# Patient Record
(demographics unavailable — no encounter records)

---

## 2025-04-01 NOTE — ASSESSMENT
[FreeTextEntry1] : 1. Chronic Headaches: - MRI brain to rule out structural causes -A trial of sumatriptan for abortive therapy.  Patient warned of the potential side effect of medication and he reported understanding  2.  Cervical radiculopathy - EMG of upper extremities ordered - MRI cervical spine pending insurance approval (may require physical therapy first)  3. Presyncope: -MRA of the brain - Carotid ultrasound  4. Suspected Sleep Apnea: - Home sleep study [place] through [facility] - Follow-up appointment to be scheduled 2 weeks after sleep study completion  5. Post-COVID Taste Loss: - Likely permanent changes given duration
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
## 2025-04-10 NOTE — ASSESSMENT
[FreeTextEntry1] : 39-year-old male with past medical history of hyperlipidemia, sleep apnea, cervicular radiculopathy, and migraines who presents today for cardiovascular evaluation.

## 2025-04-10 NOTE — HISTORY OF PRESENT ILLNESS
[FreeTextEntry1] : 39-year-old male with past medical history of hyperlipidemia, sleep apnea, cervicular radiculopathy, and migraines who presents today for cardiovascular evaluation.  The patient reports a several month history of intermittent palpitations which is described as heart racing fast and thumping.  He states that he checked his Apple Watch and his heart rates were in the 140s.  He reports chronic dizziness during this time as well and near syncopal episodes.  He reports intermittent chest tightness and dyspnea on exertion over the last 6 months.  He is currently chest pain-free.

## 2025-04-10 NOTE — REVIEW OF SYSTEMS
[SOB] : shortness of breath [Dyspnea on exertion] : dyspnea during exertion [Chest Discomfort] : chest discomfort [Lower Ext Edema] : no extremity edema [Leg Claudication] : no intermittent leg claudication [Palpitations] : palpitations [Orthopnea] : no orthopnea [PND] : no PND [Syncope] : no syncope [Negative] : Heme/Lymph

## 2025-04-10 NOTE — DISCUSSION/SUMMARY
[FreeTextEntry1] : 1.  Dyspnea on exertion I will obtain an echocardiogram to evaluate LV function and rule out valvular heart disease.  2.  Palpitations and near syncope. I will obtain an MCOT to rule out arrhythmias as a cause of his symptoms.  3.  Chest pain Recommend a stress echocardiogram to rule out CAD as a cause of symptoms  4.  Hyperlipidemia I have asked the patient to bring his blood work at his next visit so we can assess his LDL and to see if a statin is indicated this time.  I will follow up with the patient after his above testing or sooner if he develops and new or worsening symptoms.  [EKG obtained to assist in diagnosis and management of assessed problem(s)] : EKG obtained to assist in diagnosis and management of assessed problem(s)
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
## 2025-05-08 NOTE — PROCEDURE
[Recalcitrant Symptoms] : recalcitrant symptoms  [Anatomical Abnormality] : anatomical abnormality [Anterior rhinoscopy insufficient to account for symptoms] : anterior rhinoscopy insufficient to account for symptoms [Congested] : congested [Maribeth] : maribeth [Normal] : the paranasal sinuses had no abnormalities [FreeTextEntry2] : Deviated septum

## 2025-05-08 NOTE — PHYSICAL EXAM
[Nasal Endoscopy Performed] : nasal endoscopy was performed, see procedure section for findings [Normal] : mucosa is normal [Midline] : trachea located in midline position [de-identified] : right impacted wax cleaned with curette

## 2025-05-08 NOTE — REASON FOR VISIT
[Initial Evaluation] : an initial evaluation for [FreeTextEntry2] : tinnitus, loss of taste, nasal congestion, GALO

## 2025-05-08 NOTE — ASSESSMENT
[FreeTextEntry1] : Wax found and cleaned. Cleaning well tolerated by patient. Patient felt improvement in cloginess after cleaning.  I reviewed, interpreted, and discussed the Audiogram done today. bilateral SNHL.  Modified Toma maneuver: positive +++  Patient will possible need a nasal valve and turbinates intervention. Discussed details and indications.
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
## 2025-05-15 NOTE — DISCUSSION/SUMMARY
[FreeTextEntry1] : 1.  Dyspnea on Exertion - Echocardiogram performed today revealed normal LV function and no significant valvular heart disease.   2.  Chest pain - Stress echocardiogram performed today demonstrated normal augmentation with exercise with no evidence of ischemia.  - Symptoms have since resolved. I suspect his symptoms are non-cardiac in nature. - Asked the patient to follow up with GI for possible GI issues as etiology of his symptoms.   3. Palpitations: - Patient underwent MCOT that demonstrated no significant malignant arrhythmias  - If palpitations reoccur, will consider further EP workup.   4.  Hyperlipidemia - I have asked the patient to bring his blood work in so we can assess his LDL and to see if a statin is indicated this time. - Other planned treatment includes diet modification, exercise, and weight loss.  Instructed to follow up in 6 months, or sooner for new or worsening symptoms.  Plan was discussed with the patient.

## 2025-05-15 NOTE — REVIEW OF SYSTEMS
[Palpitations] : palpitations [Negative] : Heme/Lymph [Lower Ext Edema] : no extremity edema [Leg Claudication] : no intermittent leg claudication [Orthopnea] : no orthopnea [PND] : no PND [Syncope] : no syncope [FreeTextEntry5] : (+) Prior Chest Pain, (+) Prior Palpitations, (+) WILLIAM

## 2025-05-15 NOTE — ASSESSMENT
[FreeTextEntry1] : LUIS ALFREDO HILLIARD is a pleasant 39-year-old male, with a PMHx significant for HLD, sleep apnea, cervical radiculopathy, and migraines, who presents today for cardiovascular follow-up and stress echocardiogram for evaluation of chest pain.

## 2025-05-15 NOTE — HISTORY OF PRESENT ILLNESS
[FreeTextEntry1] : LUIS ALFREDO HILLIARD is a pleasant 39-year-old male, with a PMHx significant for HLD, sleep apnea, cervical radiculopathy, and migraines, who presents today for cardiovascular follow-up and stress echocardiogram for evaluation of chest pain. Patient reports resolution of his chest discomfort since his last visit. He states his palpitations have resolved since his last visit as well. Reports he otherwise feels well.

## 2025-05-22 NOTE — REVIEW OF SYSTEMS
[Negative] : Endocrine [Recent Wt Gain (___ Lbs)] : ~T recent [unfilled] lb weight gain [Headache] : headache
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
## 2025-07-02 NOTE — ASSESSMENT
[FreeTextEntry1] : ## Palpitations   ## NSVT    - MCOT, EKG, echo reviewed.   - Palpitations can be due to panic attacks. SVT versus VT.   - Offered MCOT versus ILR.   - After detailed discussion of pros and cons, he does not want to proceed with anything.   - Patient will self-monitor using Kardia device.   - Cardio follow-up.  - RTC as needed.   I spent total 75 minutes in preparing for the visit (such as reviewing tests); getting or reviewing a history that was separately obtained; performing the exam; counseling and providing education to the patient, family, or caregiver; ordering medicines, tests, or procedures; communicating with other healthcare professionals; documenting information in the medical record; interpreting results and sharing that information with the patient, family, or caregiver; and care coordination.    EKG obtained to assist in diagnosis and management of assessed problem(s).

## 2025-07-02 NOTE — HISTORY OF PRESENT ILLNESS
[FreeTextEntry1] : Mr. Krishnamurthy is a 39-year-old male with PMHx of DLD, GALO. obesity, cervical radiculopathy, migraines, is here for palpitations.   Had multiple episodes of racing heart. Feels like it is going fast but not irregular.    Attributed to panic attacks. Prior events related to wife going to the hospital for her in-laws. Since then. he became involved with therapy and lost weight intentionally. Feel much better. No episodes since then. Feels okay overall.      Denies chest pain, shortness of breath, palpitation, dizziness or LOC except noted above.  EKG (07/01/2025): SR @ 89, , QRS 88, QTc 411  Exercise stress echo (03/2025): Normal EF, no signs of ischemia.  Cardio: Dr. Boyer

## 2025-07-02 NOTE — END OF VISIT
[FreeTextEntry3] :   All medical record entries made by my scribe were at my, Dr. Reji Phipps, direction and personally dictated by me. I have reviewed the chart and agree that the record accurately reflects my personal performance of the history, physical exam, assessment and plan. I have also personally directed, reviewed, and agreed with the chart.

## 2025-07-02 NOTE — ADDENDUM
[FreeTextEntry1] : Becka FORD assisted in documentation on 07/02/2025 acting as a scribe for Dr. Reji Phipps.
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
## 2025-07-12 NOTE — ASSESSMENT
[FreeTextEntry1] : Cervical Radiculopathy  - Reorder MRI of the cervical spine wo contrast - Valium recommended, but the patient declined at this time.  Chronic Migraine wo Aura - Reorder MRI of the brain wo contrast  Dizziness - Reorder MRA of the brain - Reorder US duplex carotid - Follow up in 1 month after testing is complete  sleep apnea-previously suspected -PSG results reviewed and discussed with patients.  negative for GALO -advised to continue to monitor for symptoms, may consider repeat study in the future.  I, Andreas Ramos, attest that this documentation has been prepared under the direction and in the presence of Provider Jonah Ayala DO.   Thank you for allowing me to assist in the management of this patient.   Jonah Ayala DO Board Certified, Neurology.      Total clinician time spent  is  31 minutes including preparing to see the patient, obtaining and/or reviewing and confirming history, performing a medically necessary and appropriate examination, counseling and educating the patient and/or family, documenting clinical information in the HER and communicating and/or referring to other healthcare professionals.
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
## 2025-07-15 NOTE — ASSESSMENT
[FreeTextEntry1] : 39 yr old male at average risk for CRC, H/O SOB and Palpitations (PFTs normal), Anxiety, Hearing Loss, Ex- smoker, Saw Pulmonary and then Cardiology and Stress Echo was normal. He was referred here by his PCP Dr. Navas for further evaluation of heartburn.    Heartburn, longstanding - Will schedule an EGD to assess for Nam's Esophagus - GERD diet discussed and he was told to avoid eating or drinking within 3 hrs of hs - NSAID avoidance advised - Labs reviewed  - He was offered famotidine or PPI but he declined pending EGD   Constipation - He was told to increase fiber and water intake - He was told to add metamucil 1 tsp OD to bid - If still with constipation with the above, add miralax 1 scoop OD to bid  f/U after EGD is done

## 2025-07-15 NOTE — HISTORY OF PRESENT ILLNESS
[FreeTextEntry1] : 39 yr old male at average risk for CRC, H/O SOB and Palpitations (PFTs normal), Anxiety, Hearing Loss, Ex- smoker, Saw Pulmonary and then Cardiology and Stress Echo was normal. He was referred here by his PCP Dr. Navas for further evaluation of heartburn.  He reported a H/O heartburn dating back to his early teens. He was never put on medications, but he had changed his diet in the past with good response. More recently, he was having heartburn q day to QOD, depending on his diet. H has changed his diet to blander foods; low Carb and the heartburn has resolved. He has not had any heartburn for the past 2 months. No nausea, vomiting, abd pain, weight loss, dysphagia, diarrhea, or blood in the stool. He C/O constipation since he started the bland, low Carb diet and sometimes does not have a BM x 4 days.   He had an EGD many years ago in [place] and was unremarkable per patient, HP negative  Labs done 3/25: Stool for HP Ag negative  CBC Hgb 15.4 CMP   LFTs normal  Hep B SAg Nonreactive Hep B SAb reactive Hep C Ab nonreactive

## 2025-07-15 NOTE — PHYSICAL EXAM
[Alert] : alert [Normal Voice/Communication] : normal voice/communication [No Acute Distress] : no acute distress [Well Developed] : well developed [Well Nourished] : well nourished [Sclera] : the sclera and conjunctiva were normal [Hearing Threshold Finger Rub Not [place]] : hearing was normal [Normal Appearance] : the appearance of the neck was normal [No Respiratory Distress] : no respiratory distress [No Acc Muscle Use] : no accessory muscle use [Respiration, Rhythm And Depth] : normal respiratory rhythm and effort [Abnormal Walk] : normal gait [Normal Color / Pigmentation] : normal skin color and pigmentation [Oriented To Time, Place, And Person] : oriented to person, place, and time [Auscultation Breath Sounds / Voice Sounds] : lungs were clear to auscultation bilaterally [Heart Rate And Rhythm] : heart rate was normal and rhythm regular [Normal S1, S2] : normal S1 and S2 [Bowel Sounds] : normal bowel sounds [Abdomen Tenderness] : non-tender [Abdomen Soft] : soft

## 2025-07-21 NOTE — HISTORY OF PRESENT ILLNESS
[Asthma] : asthma [Eczematous rashes] : eczematous rashes [Venom Reactions] : venom reactions [Food Allergies] : food allergies [Drug Allergies] : drug allergies [de-identified] :  RICHARDSON HILLIARD is a 39 year male who is here today, he states his primary doctor referred him to see an allergist, he has a history of environmental allergies since he was 14 years old, in the spring season his eyes feel itchy, watery and swell up, he has postnasal drip and itchy throat. He takes over the counter loratadine everyday which seems to help with his symptoms.

## 2025-07-21 NOTE — SOCIAL HISTORY
[House] : [unfilled] lives in a house  [Central Forced Air] : heating provided by central forced air [Central] : air conditioning provided by central unit [Dry] : dry [Living Area] : in living area [None] : none [] :  [Dehumidifier] : does not use a dehumidifier [Cockroaches] : Patient states that there are no cockroaches in the home [Bedroom] : not in the bedroom [Basement] : not in the basement [Smokers in Household] : there are no smokers in the home

## 2025-07-21 NOTE — ASSESSMENT
[FreeTextEntry1] : 1. AR/AC - immunocap positive to DM, grass and tree pollen - will treat in spring if needed.